# Patient Record
(demographics unavailable — no encounter records)

---

## 2025-02-16 NOTE — REASON FOR VISIT
[Follow-Up] : a follow-up visit [Shortness of Breath] : shortness of breath [Sleep Evaluation] : sleep evaluation

## 2025-03-21 NOTE — PHYSICAL EXAM
[No Acute Distress] : no acute distress [Well Nourished] : well nourished [Well Developed] : well developed [Enlarged Base of the Tongue] : enlarged base of the tongue [IV] : Mallampati Class: IV [Supple] : supple [No JVD] : no jvd [Normal Rate/Rhythm] : normal rate/rhythm [Normal S1, S2] : normal s1, s2 [No Murmurs] : no murmurs [No Resp Distress] : no resp distress [Clear to Auscultation Bilaterally] : clear to auscultation bilaterally [Benign] : benign [Not Tender] : not tender [No Masses] : no masses [No Clubbing] : no clubbing [No Edema] : no edema [No Focal Deficits] : no focal deficits [No Motor Deficits] : no motor deficits [Oriented x3] : oriented x3 [TextBox_44] : thick

## 2025-03-21 NOTE — HISTORY OF PRESENT ILLNESS
[Current] : current [Never] : never [TextBox_4] : 41 year old patient presents for evaluation of possible obstructive sleep apnea Has  history of heavy snoring and apneic episodes.  Of importance, he  works as .  He states he has an assistant that is able to [id number] him  He has daytime somnolence but states he does not fall asleep while driving  He is also a smoker        Primary doctor is Dr. Lynette Barakat     PSH: ventral  hernia       PMH:    Asthma  on Symbicort    eczema  was on Dupixent, now on creams, on oral steroid med now     SH:     active smoker     ETOH:  occasional     Occupation: works as        ALLERGY:   NKDA     environmental/seasonal allergy: pollen  cats, pets       Review of Systems:    no pneumonia has asthma no lung cancer   no CAD no MI no chest pain no murmur no CHF  no edema   has H pylori no abdominal pain    no Diabetes no thyroid disease no hyperlipidemia     no bleeding   no DVT or PE   no kidney disease   no stroke no seizure                         [TextBox_11] : 0.3 [TextBox_13] : 22

## 2025-03-21 NOTE — DISCUSSION/SUMMARY
[FreeTextEntry1] : 42 year old man who has signs and symptoms of obstructive sleep apnea   he also has a  history of asthma that is well controlled on Symbicort  He has eczema and seasonal allergy  He stefano .  His ESS is normal.  he does smoke cigaretes  He states that when driving he is accompanied by  another person who is able to make sure he does not fall asleep while driving  the risks of driving in persons with obstructive sleep apnea was explained and he was strongly advised to take measures to avoid falling asleep while driving.  In general, he should avoid situations that require increased vigilance including driving  PLAN I have ordered overnight sleep study that he was diagnosed with severe obstructive sleep apnea performed 04/2024  An in facility test has been ordered but he has yet to have it done.  ANITA  Order CPAP titration He will continue to use Symbicort  counselled on weight loss measures including exercise and diet  He will follow up with me   Total time spent : 30 minutes Including: Preparation prior to visit - Reviewing prior record, results of tests and Consultation Reports as applicable Conducting an appropriate H & P during today's encounter Appropriate orders for tests, medications and procedures, as applicable Counseling patient  Note completion    Alexander Cobb MD

## 2025-03-21 NOTE — HISTORY OF PRESENT ILLNESS
[Current] : current [Never] : never [TextBox_4] : 41 year old patient presents for evaluation of possible obstructive sleep apnea Has  history of heavy snoring and apneic episodes.  Of importance, he  works as .  He states he has an assistant that is able to [id number] him  He has daytime somnolence but states he does not fall asleep while driving  He is also a smoker        Primary doctor is Dr. Lynette Barakat     PSH: ventral  hernia       PMH:    Asthma  on Symbicort    eczema  was on Dupixent, now on creams, on oral steroid med now     SH:     active smoker     ETOH:  occasional     Occupation: works as        ALLERGY:   NKDA     environmental/seasonal allergy: pollen  cats, pets       Review of Systems:    no pneumonia has asthma no lung cancer   no CAD no MI no chest pain no murmur no CHF  no edema   has H pylori no abdominal pain    no Diabetes no thyroid disease no hyperlipidemia     no bleeding   no DVT or PE   no kidney disease   no stroke no seizure                         [TextBox_11] : 0.3 [TextBox_13] : 22

## 2025-03-21 NOTE — HISTORY OF PRESENT ILLNESS
[Current] : current [Never] : never [TextBox_4] : 41 year old patient presents for evaluation of possible obstructive sleep apnea Has  history of heavy snoring and apneic episodes.  Of importance, he  works as .  He states he has an assistant that is able to [id number] him  He has daytime somnolence but states he does not fall asleep while driving  He is also a smoker        Primary doctor is Dr. Lynette Barakat     PSH: ventral  hernia       PMH:    Asthma  on Symbicort    eczema  was on Dupixent, now on creams, on oral steroid med now     SH:     active smoker     ETOH:  occasional     Occupation: works as        ALLERGY:   NKDA     environmental/seasonal allergy: pollen  cats, pets       Review of Systems:    no pneumonia has asthma no lung cancer   no CAD no MI no chest pain no murmur no CHF  no edema   has H pylori no abdominal pain    no Diabetes no thyroid disease no hyperlipidemia     no bleeding   no DVT or PE   no kidney disease   no stroke no seizure                         [TextBox_11] : 0.3 [TextBox_13] : 22